# Patient Record
Sex: FEMALE | Race: WHITE | NOT HISPANIC OR LATINO | Employment: OTHER | URBAN - METROPOLITAN AREA
[De-identification: names, ages, dates, MRNs, and addresses within clinical notes are randomized per-mention and may not be internally consistent; named-entity substitution may affect disease eponyms.]

---

## 2018-02-06 ENCOUNTER — OFFICE VISIT CONVERTED (OUTPATIENT)
Dept: FAMILY MEDICINE CLINIC | Facility: CLINIC | Age: 33
End: 2018-02-06
Attending: NURSE PRACTITIONER

## 2018-08-13 ENCOUNTER — OFFICE VISIT CONVERTED (OUTPATIENT)
Dept: FAMILY MEDICINE CLINIC | Facility: CLINIC | Age: 33
End: 2018-08-13
Attending: NURSE PRACTITIONER

## 2020-12-21 ENCOUNTER — APPOINTMENT (OUTPATIENT)
Dept: ULTRASOUND IMAGING | Facility: HOSPITAL | Age: 35
End: 2020-12-21

## 2020-12-21 ENCOUNTER — HOSPITAL ENCOUNTER (EMERGENCY)
Facility: HOSPITAL | Age: 35
Discharge: HOME | End: 2020-12-21
Attending: EMERGENCY MEDICINE | Admitting: EMERGENCY MEDICINE

## 2020-12-21 ENCOUNTER — APPOINTMENT (OUTPATIENT)
Dept: CT IMAGING | Facility: HOSPITAL | Age: 35
End: 2020-12-21

## 2020-12-21 VITALS
RESPIRATION RATE: 16 BRPM | WEIGHT: 220 LBS | HEART RATE: 78 BPM | DIASTOLIC BLOOD PRESSURE: 67 MMHG | HEIGHT: 70 IN | TEMPERATURE: 97.7 F | OXYGEN SATURATION: 100 % | SYSTOLIC BLOOD PRESSURE: 129 MMHG | BODY MASS INDEX: 31.5 KG/M2

## 2020-12-21 DIAGNOSIS — R10.2 PELVIC PAIN: Primary | ICD-10-CM

## 2020-12-21 LAB
ALBUMIN SERPL-MCNC: 4.5 G/DL (ref 3.5–5.2)
ALBUMIN/GLOB SERPL: 1.5 G/DL
ALP SERPL-CCNC: 69 U/L (ref 39–117)
ALT SERPL W P-5'-P-CCNC: 12 U/L (ref 1–33)
ANION GAP SERPL CALCULATED.3IONS-SCNC: 13 MMOL/L (ref 5–15)
AST SERPL-CCNC: 17 U/L (ref 1–32)
B-HCG UR QL: NEGATIVE
BASOPHILS # BLD AUTO: 0.03 10*3/MM3 (ref 0–0.2)
BASOPHILS NFR BLD AUTO: 0.2 % (ref 0–1.5)
BILIRUB SERPL-MCNC: 0.2 MG/DL (ref 0–1.2)
BILIRUB UR QL STRIP: NEGATIVE
BUN SERPL-MCNC: 12 MG/DL (ref 6–20)
BUN/CREAT SERPL: 15.6 (ref 7–25)
CALCIUM SPEC-SCNC: 9.6 MG/DL (ref 8.6–10.5)
CHLORIDE SERPL-SCNC: 101 MMOL/L (ref 98–107)
CLARITY UR: CLEAR
CO2 SERPL-SCNC: 25 MMOL/L (ref 22–29)
COLOR UR: ABNORMAL
CREAT SERPL-MCNC: 0.77 MG/DL (ref 0.57–1)
DEPRECATED RDW RBC AUTO: 41.1 FL (ref 37–54)
EOSINOPHIL # BLD AUTO: 0.13 10*3/MM3 (ref 0–0.4)
EOSINOPHIL NFR BLD AUTO: 1 % (ref 0.3–6.2)
ERYTHROCYTE [DISTWIDTH] IN BLOOD BY AUTOMATED COUNT: 12.3 % (ref 12.3–15.4)
GFR SERPL CREATININE-BSD FRML MDRD: 86 ML/MIN/1.73
GLOBULIN UR ELPH-MCNC: 3 GM/DL
GLUCOSE SERPL-MCNC: 96 MG/DL (ref 65–99)
GLUCOSE UR STRIP-MCNC: NEGATIVE MG/DL
HCT VFR BLD AUTO: 40.6 % (ref 34–46.6)
HGB BLD-MCNC: 13.5 G/DL (ref 12–15.9)
HGB UR QL STRIP.AUTO: NEGATIVE
HOLD SPECIMEN: NORMAL
HOLD SPECIMEN: NORMAL
IMM GRANULOCYTES # BLD AUTO: 0.03 10*3/MM3 (ref 0–0.05)
IMM GRANULOCYTES NFR BLD AUTO: 0.2 % (ref 0–0.5)
INTERNAL NEGATIVE CONTROL: NEGATIVE
INTERNAL POSITIVE CONTROL: POSITIVE
KETONES UR QL STRIP: ABNORMAL
LEUKOCYTE ESTERASE UR QL STRIP.AUTO: NEGATIVE
LIPASE SERPL-CCNC: 36 U/L (ref 13–60)
LYMPHOCYTES # BLD AUTO: 2.35 10*3/MM3 (ref 0.7–3.1)
LYMPHOCYTES NFR BLD AUTO: 17.7 % (ref 19.6–45.3)
Lab: NORMAL
MCH RBC QN AUTO: 30.7 PG (ref 26.6–33)
MCHC RBC AUTO-ENTMCNC: 33.3 G/DL (ref 31.5–35.7)
MCV RBC AUTO: 92.3 FL (ref 79–97)
MONOCYTES # BLD AUTO: 0.97 10*3/MM3 (ref 0.1–0.9)
MONOCYTES NFR BLD AUTO: 7.3 % (ref 5–12)
NEUTROPHILS NFR BLD AUTO: 73.6 % (ref 42.7–76)
NEUTROPHILS NFR BLD AUTO: 9.8 10*3/MM3 (ref 1.7–7)
NITRITE UR QL STRIP: NEGATIVE
NRBC BLD AUTO-RTO: 0 /100 WBC (ref 0–0.2)
PH UR STRIP.AUTO: <=5 [PH] (ref 5–8)
PLATELET # BLD AUTO: 280 10*3/MM3 (ref 140–450)
PMV BLD AUTO: 10.1 FL (ref 6–12)
POTASSIUM SERPL-SCNC: 3.6 MMOL/L (ref 3.5–5.2)
PROT SERPL-MCNC: 7.5 G/DL (ref 6–8.5)
PROT UR QL STRIP: NEGATIVE
RBC # BLD AUTO: 4.4 10*6/MM3 (ref 3.77–5.28)
SODIUM SERPL-SCNC: 139 MMOL/L (ref 136–145)
SP GR UR STRIP: 1.03 (ref 1–1.03)
UROBILINOGEN UR QL STRIP: ABNORMAL
WBC # BLD AUTO: 13.31 10*3/MM3 (ref 3.4–10.8)
WHOLE BLOOD HOLD SPECIMEN: NORMAL
WHOLE BLOOD HOLD SPECIMEN: NORMAL

## 2020-12-21 PROCEDURE — 81025 URINE PREGNANCY TEST: CPT | Performed by: EMERGENCY MEDICINE

## 2020-12-21 PROCEDURE — 80053 COMPREHEN METABOLIC PANEL: CPT

## 2020-12-21 PROCEDURE — 74176 CT ABD & PELVIS W/O CONTRAST: CPT

## 2020-12-21 PROCEDURE — 81003 URINALYSIS AUTO W/O SCOPE: CPT

## 2020-12-21 PROCEDURE — 25010000002 KETOROLAC TROMETHAMINE PER 15 MG: Performed by: EMERGENCY MEDICINE

## 2020-12-21 PROCEDURE — 99283 EMERGENCY DEPT VISIT LOW MDM: CPT

## 2020-12-21 PROCEDURE — 96374 THER/PROPH/DIAG INJ IV PUSH: CPT

## 2020-12-21 PROCEDURE — 83690 ASSAY OF LIPASE: CPT

## 2020-12-21 PROCEDURE — 76830 TRANSVAGINAL US NON-OB: CPT

## 2020-12-21 PROCEDURE — 85025 COMPLETE CBC W/AUTO DIFF WBC: CPT

## 2020-12-21 RX ORDER — KETOROLAC TROMETHAMINE 15 MG/ML
15 INJECTION, SOLUTION INTRAMUSCULAR; INTRAVENOUS ONCE
Status: COMPLETED | OUTPATIENT
Start: 2020-12-21 | End: 2020-12-21

## 2020-12-21 RX ORDER — DOXYCYCLINE HYCLATE 50 MG/1
324 CAPSULE, GELATIN COATED ORAL
COMMUNITY

## 2020-12-21 RX ORDER — SODIUM CHLORIDE 9 MG/ML
10 INJECTION INTRAVENOUS AS NEEDED
Status: DISCONTINUED | OUTPATIENT
Start: 2020-12-21 | End: 2020-12-22 | Stop reason: HOSPADM

## 2020-12-21 RX ORDER — HYDROMORPHONE HYDROCHLORIDE 1 MG/ML
0.5 INJECTION, SOLUTION INTRAMUSCULAR; INTRAVENOUS; SUBCUTANEOUS ONCE
Status: DISCONTINUED | OUTPATIENT
Start: 2020-12-21 | End: 2020-12-22 | Stop reason: HOSPADM

## 2020-12-21 RX ORDER — ZOLPIDEM TARTRATE 12.5 MG/1
12.5 TABLET, FILM COATED, EXTENDED RELEASE ORAL NIGHTLY PRN
COMMUNITY

## 2020-12-21 RX ORDER — ONDANSETRON 2 MG/ML
4 INJECTION INTRAMUSCULAR; INTRAVENOUS ONCE
Status: DISCONTINUED | OUTPATIENT
Start: 2020-12-21 | End: 2020-12-22 | Stop reason: HOSPADM

## 2020-12-21 RX ORDER — ACETAMINOPHEN 500 MG
1000 TABLET ORAL ONCE
Status: COMPLETED | OUTPATIENT
Start: 2020-12-21 | End: 2020-12-21

## 2020-12-21 RX ADMIN — SODIUM CHLORIDE 1000 ML: 9 INJECTION, SOLUTION INTRAVENOUS at 19:59

## 2020-12-21 RX ADMIN — KETOROLAC TROMETHAMINE 15 MG: 15 INJECTION, SOLUTION INTRAMUSCULAR; INTRAVENOUS at 22:22

## 2020-12-21 RX ADMIN — ACETAMINOPHEN 1000 MG: 500 TABLET ORAL at 22:21

## 2020-12-22 NOTE — ED PROVIDER NOTES
EMERGENCY DEPARTMENT ENCOUNTER    Room Number:  12/12  Date of encounter:  12/21/2020  PCP: Bee Cunningham APRN  Historian: Patient      HPI:  Chief Complaint: Abdominal pain        Context: Yulia Francisco is a 35 y.o. female who presents to the ED c/o abdominal pain starting around 1230 this afternoon.  Patient reports sudden onset of discomfort which then ramped up to 8-9 out of 10 on the pain scale.  Her pain is mostly located in the right lower quadrant but she has diffuse abdominal discomfort as well.  Anorexia without vomiting.  No fevers or chills.  No dysuria or hematuria.  No history of abdominal surgeries or nephrolithiasis.  She has not taken any medications at home for pain relief.  She presented to an urgent treatment center where she was given a shot of Toradol which did bring her discomfort down to 5 out of 10.  She does have a history of ovarian cysts but it was never had an extremely large one that she is aware of.  She has had no recent problems with ovarian cysts as far she is aware.      PAST MEDICAL HISTORY  Active Ambulatory Problems     Diagnosis Date Noted   • No Active Ambulatory Problems     Resolved Ambulatory Problems     Diagnosis Date Noted   • No Resolved Ambulatory Problems     Past Medical History:   Diagnosis Date   • Anxiety    • Fibroids    • Insomnia    • Low iron          PAST SURGICAL HISTORY  History reviewed. No pertinent surgical history.      FAMILY HISTORY  History reviewed. No pertinent family history.      SOCIAL HISTORY  Social History     Socioeconomic History   • Marital status: Single     Spouse name: Not on file   • Number of children: Not on file   • Years of education: Not on file   • Highest education level: Not on file   Tobacco Use   • Smoking status: Never Smoker   • Smokeless tobacco: Never Used   Substance and Sexual Activity   • Alcohol use: Yes   • Drug use: Yes     Types: Marijuana         ALLERGIES  Iodine        REVIEW OF SYSTEMS  Review of  Systems     All systems reviewed and negative except for those discussed in HPI.       PHYSICAL EXAM    I have reviewed the triage vital signs and nursing notes.    ED Triage Vitals [12/21/20 1706]   Temp Heart Rate Resp BP SpO2   97.7 °F (36.5 °C) 90 16 144/94 99 %      Temp src Heart Rate Source Patient Position BP Location FiO2 (%)   -- -- -- -- --       Physical Exam  GENERAL:   Appears uncomfortable but nontoxic.  Very pleasant..  HENT: Nares patent.  EYES: No scleral icterus.  CV: Regular rhythm, regular rate.  No murmurs gallops rubs  RESPIRATORY: Normal effort.  No audible wheezes, rales or rhonchi.  Clear to auscultation  ABDOMEN: Soft, quite tender in the right lower quadrant.  Mild tenderness in right upper and left lower quadrant.  Negative Uribe's sign.    MUSCULOSKELETAL: No deformities.  No CVA tenderness  NEURO: Alert, moves all extremities, follows commands.  SKIN: Warm, dry, no rash visualized.        LAB RESULTS  Recent Results (from the past 24 hour(s))   Comprehensive Metabolic Panel    Collection Time: 12/21/20  6:42 PM    Specimen: Blood   Result Value Ref Range    Glucose 96 65 - 99 mg/dL    BUN 12 6 - 20 mg/dL    Creatinine 0.77 0.57 - 1.00 mg/dL    Sodium 139 136 - 145 mmol/L    Potassium 3.6 3.5 - 5.2 mmol/L    Chloride 101 98 - 107 mmol/L    CO2 25.0 22.0 - 29.0 mmol/L    Calcium 9.6 8.6 - 10.5 mg/dL    Total Protein 7.5 6.0 - 8.5 g/dL    Albumin 4.50 3.50 - 5.20 g/dL    ALT (SGPT) 12 1 - 33 U/L    AST (SGOT) 17 1 - 32 U/L    Alkaline Phosphatase 69 39 - 117 U/L    Total Bilirubin 0.2 0.0 - 1.2 mg/dL    eGFR Non African Amer 86 >60 mL/min/1.73    Globulin 3.0 gm/dL    A/G Ratio 1.5 g/dL    BUN/Creatinine Ratio 15.6 7.0 - 25.0    Anion Gap 13.0 5.0 - 15.0 mmol/L   Lipase    Collection Time: 12/21/20  6:42 PM    Specimen: Blood   Result Value Ref Range    Lipase 36 13 - 60 U/L   Light Blue Top    Collection Time: 12/21/20  6:42 PM   Result Value Ref Range    Extra Tube hold for add-on     Green Top (Gel)    Collection Time: 12/21/20  6:42 PM   Result Value Ref Range    Extra Tube Hold for add-ons.    Lavender Top    Collection Time: 12/21/20  6:42 PM   Result Value Ref Range    Extra Tube hold for add-on    Gold Top - SST    Collection Time: 12/21/20  6:42 PM   Result Value Ref Range    Extra Tube Hold for add-ons.    CBC Auto Differential    Collection Time: 12/21/20  6:42 PM    Specimen: Blood   Result Value Ref Range    WBC 13.31 (H) 3.40 - 10.80 10*3/mm3    RBC 4.40 3.77 - 5.28 10*6/mm3    Hemoglobin 13.5 12.0 - 15.9 g/dL    Hematocrit 40.6 34.0 - 46.6 %    MCV 92.3 79.0 - 97.0 fL    MCH 30.7 26.6 - 33.0 pg    MCHC 33.3 31.5 - 35.7 g/dL    RDW 12.3 12.3 - 15.4 %    RDW-SD 41.1 37.0 - 54.0 fl    MPV 10.1 6.0 - 12.0 fL    Platelets 280 140 - 450 10*3/mm3    Neutrophil % 73.6 42.7 - 76.0 %    Lymphocyte % 17.7 (L) 19.6 - 45.3 %    Monocyte % 7.3 5.0 - 12.0 %    Eosinophil % 1.0 0.3 - 6.2 %    Basophil % 0.2 0.0 - 1.5 %    Immature Grans % 0.2 0.0 - 0.5 %    Neutrophils, Absolute 9.80 (H) 1.70 - 7.00 10*3/mm3    Lymphocytes, Absolute 2.35 0.70 - 3.10 10*3/mm3    Monocytes, Absolute 0.97 (H) 0.10 - 0.90 10*3/mm3    Eosinophils, Absolute 0.13 0.00 - 0.40 10*3/mm3    Basophils, Absolute 0.03 0.00 - 0.20 10*3/mm3    Immature Grans, Absolute 0.03 0.00 - 0.05 10*3/mm3    nRBC 0.0 0.0 - 0.2 /100 WBC   Urinalysis With Microscopic If Indicated (No Culture) - Urine, Clean Catch    Collection Time: 12/21/20  6:48 PM    Specimen: Urine, Clean Catch   Result Value Ref Range    Color, UA Dark Yellow (A) Yellow, Straw    Appearance, UA Clear Clear    pH, UA <=5.0 5.0 - 8.0    Specific Gravity, UA 1.029 1.001 - 1.030    Glucose, UA Negative Negative    Ketones, UA 15 mg/dL (1+) (A) Negative    Bilirubin, UA Negative Negative    Blood, UA Negative Negative    Protein, UA Negative Negative    Leuk Esterase, UA Negative Negative    Nitrite, UA Negative Negative    Urobilinogen, UA 0.2 E.U./dL 0.2 - 1.0 E.U./dL    POCT, urine preg    Collection Time: 12/21/20  6:49 PM    Specimen: Urine   Result Value Ref Range    HCG, Urine, QL Negative Negative    Lot Number [id number]     Internal Positive Control Positive     Internal Negative Control Negative        If labs were ordered, I independently reviewed the results.        RADIOLOGY  Ct Abdomen Pelvis Without Contrast    Result Date: 12/21/2020  CT Abdomen Pelvis WO INDICATION: RLQ pain TECHNIQUE: CT of the abdomen and pelvis without IV contrast. Coronal and sagittal reconstructions were obtained.  Radiation dose reduction techniques included automated exposure control or exposure modulation based on body size. Count of known CT and cardiac nuc med studies performed in previous 12 months: 0. COMPARISON: None available. FINDINGS: Abdomen: The lung bases are clear. Liver, spleen, pancreas and kidneys appear unremarkable. Bowel appears unremarkable. Pelvis: Appendix appears unremarkable. There is suggestion of hemoperitoneum seen on series 2 images 70 through 74. This is of uncertain etiology but may be of gynecological origin. No acute osseous abnormality.     Findings suggest hemoperitoneum in the pelvis. It is thought that this may be most likely gynecological in origin. If clinically indicated, consider further evaluation with CT angiogram of the abdomen. No acute osseous abnormality. NOTIFICATION: Critical Value/emergent results were called by telephone at the time of interpretation on 12/21/2020 7:34 PM to Dr. Shade MD who verbally acknowledged these results. Signer Name: Cherie Yin MD  Signed: 12/21/2020 7:39 PM  Workstation Name: [id number]  [facility] Non-ob Transvaginal    Result Date: 12/21/2020  US Transvaginal INDICATION: Pelvic pain. COMPARISON: CT abdomen and pelvis earlier same day TECHNIQUE: Initial pelvic survey was performed transabdominally. The remainder of the examination was performed endovaginally to provide adequate  imaging detail. FINDINGS: Fluid is seen in the cul-de-sac. *  Uterus: 8.9 x 4.8 x 5.3 and contains multiple known fibroids, the largest of which measures 4.1 x 3.3 x 3 cm. *  Endometrial thickness: 1 cm. *  Right ovary: 2.6 x 1.8 x 2.2 cm with multiple follicles. Normal color flow is seen. Debris-filled fluid is seen surrounding the right ovary. *  Left ovary: Unable to be visualized.     Limited exam. The left ovary was unable to be visualized. Debris-filled fluid is seen surrounding the right ovary, and the visualized right ovary appears normal. There are multiple uterine fibroids. Signer Name: Cherie Yin MD  Signed: 12/21/2020 9:58 PM  Workstation Name: [id number]  [facility]      I ordered and reviewed the above noted radiographic studies.    I viewed images of abdominal pelvic CT scan which showed no large hemorrhage, no evidence of acute appendicitis per my independent interpretation.  See radiologist's dictation for official interpretation.        PROCEDURES    Procedures    No orders to display       MEDICATIONS GIVEN IN ER    Medications   Sodium Chloride (PF) 0.9 % 10 mL (has no administration in time range)   ondansetron (ZOFRAN) injection 4 mg (4 mg Intravenous Not Given 12/21/20 2002)   HYDROmorphone (DILAUDID) injection 0.5 mg (0.5 mg Intravenous Not Given 12/21/20 2002)   ketorolac (TORADOL) injection 15 mg (has no administration in time range)   acetaminophen (TYLENOL) tablet 1,000 mg (has no administration in time range)   sodium chloride 0.9 % bolus 1,000 mL (0 mL Intravenous Stopped 12/21/20 2143)         PROGRESS, DATA ANALYSIS, CONSULTS, AND MEDICAL DECISION MAKING    All labs have been independently reviewed by me.  All radiology studies have been reviewed by me and the radiologist dictating the report.   EKG's have been independently viewed and interpreted by me.      Differential diagnoses: Hemorrhagic ovarian cyst versus ovarian torsion versus appendicitis versus  ureteral colic, etc.      ED Course as of Dec 21 2223   Mon Dec 21, 2020   2015 I rechecked the patient.  She is feeling somewhat improved and declines any pain medications at this point.  She reports her iodine allergy was discovered when she was undergoing colposcopy and iodine was placed on her cervix.  Apparently the cervix became extremely swollen/enlarged.  We will do our best to avoid IV contrast at this point.    [MS]   2215 Patient has iodine allergy and we will hold off on CTA of the abdomen pelvis.  I have reviewed images of both the ultrasound as well as the CT scan abdomen pelvis.  I do not appreciate a large volume of blood/fluid in the pelvis.    I have rechecked the patient and without any narcotics she is feeling slightly improved.  She wishes to stay away from narcotics.  I discussed risks and benefits of CTA of the abdomen pelvis and she agrees that we should hold off at this point.  She has a gynecologist and low-volume would like to follow-up with him as opposed to seeing one of our gynecologist here.  I have asked her to call first thing in the morning to set up same day or next day follow-up.  Patient understands need to return immediately if worse.    [MS]   2219 I will not send the patient home with narcotics so as to avoid covering up a more serious etiology to her discomfort.  She is quite comfortable with this.    [MS]   2222 I have called to have a copy of the patient's radiologic imaging made so that she may bring this to her gynecologist.    [MS]      ED Course User Index  [MS] Andrew Laguerre MD             AS OF 22:20 EST VITALS:    BP - 129/67  HR - 78  TEMP - 97.7 °F (36.5 °C)  O2 SATS - 100%        DIAGNOSIS  Final diagnoses:   Pelvic pain         DISPOSITION  DISCHARGE    FOLLOW-UP  Bee Cunningham, APRN  [address] [facility] [address] [phone]      NEXT AVAILABLE APPOINTMENT - RECHECK OF CONDITION    [facility] Emergency Department  [address]  [facility] [address] [phone]    IF YOU HAVE ANY CONCERN OF WORSENING CONDITION         Medication List      No changes were made to your prescriptions during this visit.                  Andrew Laguerre MD  12/22/20 0805

## 2020-12-22 NOTE — DISCHARGE INSTRUCTIONS
Several studies have shown that the combination of ibuprofen and acetaminophen is more effective at pain relief than narcotics.    Take 3 over-the-counter Ibuprofen tablets every 6 hours as needed for pain. Try to take the medication with food. If you develop upper abdominal discomfort, discontinue use.    If not taking another medication which contains Tylenol/acetaminophen, you may also take Tylenol every 6 hours, with a maximum 1,000 mg (two extra strength tablets) per dose.      Call your gynecologist first thing tomorrow morning to arrange same day or next day follow-up.    Do not hesitate to return to the emergency department if you have any concern whatsoever of worsening condition.

## 2021-05-09 VITALS
DIASTOLIC BLOOD PRESSURE: 90 MMHG | HEART RATE: 123 BPM | SYSTOLIC BLOOD PRESSURE: 140 MMHG | TEMPERATURE: 97.9 F | WEIGHT: 216 LBS | OXYGEN SATURATION: 99 %

## 2021-05-09 VITALS
WEIGHT: 217 LBS | HEART RATE: 119 BPM | DIASTOLIC BLOOD PRESSURE: 80 MMHG | OXYGEN SATURATION: 98 % | SYSTOLIC BLOOD PRESSURE: 120 MMHG